# Patient Record
Sex: MALE | Race: WHITE
[De-identification: names, ages, dates, MRNs, and addresses within clinical notes are randomized per-mention and may not be internally consistent; named-entity substitution may affect disease eponyms.]

---

## 2018-12-28 LAB
ANION GAP SERPL CALC-SCNC: 9 MMOL/L (ref 5–19)
BUN SERPL-MCNC: 18 MG/DL (ref 7–20)
CALCIUM: 10 MG/DL (ref 8.4–10.2)
CHLORIDE SERPL-SCNC: 104 MMOL/L (ref 98–107)
CO2 SERPL-SCNC: 28 MMOL/L (ref 22–30)
ERYTHROCYTE [DISTWIDTH] IN BLOOD BY AUTOMATED COUNT: 14.1 % (ref 11.5–14)
GLUCOSE SERPL-MCNC: 106 MG/DL (ref 75–110)
HCT VFR BLD CALC: 46 % (ref 37.9–51)
HGB BLD-MCNC: 15.6 G/DL (ref 13.5–17)
MCH RBC QN AUTO: 30.6 PG (ref 27–33.4)
MCHC RBC AUTO-ENTMCNC: 34 G/DL (ref 32–36)
MCV RBC AUTO: 90 FL (ref 80–97)
PLATELET # BLD: 204 10^3/UL (ref 150–450)
POTASSIUM SERPL-SCNC: 4.8 MMOL/L (ref 3.6–5)
RBC # BLD AUTO: 5.11 10^6/UL (ref 4.35–5.55)
SODIUM SERPL-SCNC: 140.5 MMOL/L (ref 137–145)
WBC # BLD AUTO: 6.9 10^3/UL (ref 4–10.5)

## 2018-12-28 NOTE — EKG REPORT
SEVERITY:- ABNORMAL ECG -

SINUS RHYTHM

LEFT ANTERIOR FASCICULAR BLOCK

BORDERLINE T WAVE ABNORMALITIES

:

Confirmed by: Michelle Woo 28-Dec-2018 14:57:53

## 2018-12-28 NOTE — RADIOLOGY REPORT (SQ)
EXAM DESCRIPTION:  CHEST PA/LATERAL



COMPLETED DATE/TIME:  12/28/2018 8:43 am



REASON FOR STUDY:  PRE-OP



COMPARISON:  None.



EXAM PARAMETERS:  NUMBER OF VIEWS: two views

TECHNIQUE: Digital Frontal and Lateral radiographic views of the chest acquired.

RADIATION DOSE: NA

LIMITATIONS: none



FINDINGS:  LUNGS AND PLEURA: No opacities, masses or pneumothorax. No pleural effusion.

MEDIASTINUM AND HILAR STRUCTURES: No masses or contour abnormalities.

HEART AND VASCULAR STRUCTURES: Heart normal size.  No evidence for failure.

BONES: No acute findings.

HARDWARE: CABG.

OTHER: No other significant finding.



IMPRESSION:  No acute findings in the chest.



TECHNICAL DOCUMENTATION:  JOB ID:  [id number]

 2011 [employer]- All Rights Reserved



Reading location - IP/workstation name: [email or web address]

## 2019-01-04 ENCOUNTER — HOSPITAL ENCOUNTER (OUTPATIENT)
Dept: HOSPITAL 62 - OROUT | Age: 84
Discharge: HOME | End: 2019-01-04
Attending: SURGERY
Payer: MEDICARE

## 2019-01-04 VITALS — DIASTOLIC BLOOD PRESSURE: 75 MMHG | SYSTOLIC BLOOD PRESSURE: 132 MMHG

## 2019-01-04 DIAGNOSIS — I10: ICD-10-CM

## 2019-01-04 DIAGNOSIS — I48.91: ICD-10-CM

## 2019-01-04 DIAGNOSIS — K40.20: Primary | ICD-10-CM

## 2019-01-04 DIAGNOSIS — Z85.46: ICD-10-CM

## 2019-01-04 DIAGNOSIS — Z01.818: ICD-10-CM

## 2019-01-04 DIAGNOSIS — Z79.899: ICD-10-CM

## 2019-01-04 DIAGNOSIS — I25.10: ICD-10-CM

## 2019-01-04 DIAGNOSIS — Z79.82: ICD-10-CM

## 2019-01-04 PROCEDURE — 85027 COMPLETE CBC AUTOMATED: CPT

## 2019-01-04 PROCEDURE — 71046 X-RAY EXAM CHEST 2 VIEWS: CPT

## 2019-01-04 PROCEDURE — C1781 MESH (IMPLANTABLE): HCPCS

## 2019-01-04 PROCEDURE — 80048 BASIC METABOLIC PNL TOTAL CA: CPT

## 2019-01-04 PROCEDURE — 84132 ASSAY OF SERUM POTASSIUM: CPT

## 2019-01-04 PROCEDURE — 93010 ELECTROCARDIOGRAM REPORT: CPT

## 2019-01-04 PROCEDURE — 49650 LAP ING HERNIA REPAIR INIT: CPT

## 2019-01-04 PROCEDURE — 93005 ELECTROCARDIOGRAM TRACING: CPT

## 2019-01-04 PROCEDURE — 36415 COLL VENOUS BLD VENIPUNCTURE: CPT

## 2019-01-04 NOTE — DISCHARGE SUMMARY
Discharge Summary (SDC)





- Discharge


Final Diagnosis: 





Large bilateral inguinal hernias


Date of Surgery: 01/04/19


Discharge Date: 01/04/19


Condition: Stable


Treatment or Instructions: 


Discharge home.  Diet as tolerated.  Activity: No lifting greater than 10 pounds

x 4 weeks.  Follow-up with me in 7-10 days.  Norco 10/325 mg p.o. every 6 hours 

as needed for pain.  Okay to shower in 48 hours.  No tub baths times 2 weeks.


Referrals: 


ARON VASQUEZ MD [Primary Care Provider] - 


Discharge Diet: As Tolerated


Respiratory Treatments at Home: Deep Breathing/Coughing, Incentive Spirometer


Discharge Activity: No Lifting Over 10 Pounds


Home Care Assistance: None Needed


Report the Following to Your Physician Immediately: Shortness of Breath, Nausea,

Vomiting, Fever over 101 Degrees, Unusual Bleeding, Redness, Swelling, Warmth

## 2019-01-04 NOTE — OPERATIVE REPORT
Nonrecallable Operative Report


DATE OF SURGERY: 01/04/19


PREOPERATIVE DIAGNOSIS: Large bilateral inguinal hernias


POSTOPERATIVE DIAGNOSIS: 1.  Large bilateral inguinal hernias.  2.  Inflammatory

reaction in the right and left lower quadrants involving the colon and small 

bowel.


OPERATION: 1.  Robot-assisted laparoscopic lysis of adhesions for greater than 1

hour.  2.  Robot-assisted laparoscopic bilateral inguinal hernia repair with 

mesh.


SURGEON: CARLITA ADLER


ANESTHESIA: GA


TISSUE REMOVED OR ALTERED: None


COMPLICATIONS: 





None apparent


ESTIMATED BLOOD LOSS: Minimal


PROCEDURE: 





Drains/implants: Right and left large 3 DMax inguinal hernia mesh.





Procedure in detail: After informed consent was obtained, the patient was 

brought to the operating room and laid in supine position.  The area of the 

abdomen was prepped and draped in a normal sterile fashion.  A supraumbilical 

incision was created with a 15 blade scalpel.  Dissection was carried through 

the subcutaneous tissue using sharp and blunt dissection.  The linea alba fascia

was incised sharply, the abdomen was entered sharply.  The balloon trocar was 

inserted, and pneumoperitoneum was achieved.  





2 right and left lateral abdominal wall trochars were placed under direct 

laparoscopic visualization.  These were 8 mm robotic trochars.  The robot was 

then brought over the patient and docked appropriately.  I then assumed my 

position at the surgeon's console.  Upon examination of the abdominal cavity 

there is noted to be an inflammatory reaction with relatively dense adhesions 

present in bilateral groins involving the colon and small bowel.  Lysis of 

adhesions was then undertaken sharply in order to free the sigmoid colon and 

small bowel from the left anterior/inferior abdominal wall.  Sharp dissection 

was also used to free the cecum and small bowel from the right lower anterior 

abdominal wall.  Once the lysis of adhesions was completed and bilateral 

inguinal hernia defects could be identified, they were found to be rather large.

 Dissection was begun in the right groin.  The peritoneum was scored 2-3 cm 

superior to the right inguinal hernia defect.  A preperitoneal dissection was 

then undertaken.  The hernia sac was reduced into the abdominal cavity using 

sharp dissection, blunt dissection, and electrocautery.  Great care was taken 

not to injure the cord structures.  Once the hernia sac was freed, a 3 DMax 

inguinal hernia mesh was placed into the preperitoneal space.  The mesh was 

sutured medially and superiorly using 2-0 Vicryl suture.  Once the mesh was 

found to lie in good place, the peritoneum was closed.  This was done using 2-0 

V lock suture in simple running fashion.  Attention was then turned to the left 

groin.





The left inguinal hernia defect was identified.  The peritoneum was scored at 

approximately 2-3 cm superior to the inguinal hernia defect.  A preperitoneal 

dissection was undertaken.  This was done with sharp dissection, blunt 

dissection, and electrocautery.  The hernia sac was freed with great care so as 

not to injure the cord structures.  Once this was completed a large left-sided 3

DMax inguinal hernia mesh was placed into the preperitoneal space.  It was 

sutured using 2-0 Vicryl suture medially and superiorly.  Once the mesh was 

sutured in, the peritoneum was closed using 2-0 V lock suture in simple running 

fashion.  Once this was completed the hernia repairs were inspected.  Everything

appeared in good order.  The bowel was also inspected and was found to be free 

of any obvious defect.  Once this was completed, the robot was undocked and I 

scrubbed back into the case.  The trochars were at this time removed.  The 

supraumbilical fascia was closed using 0 Vicryl suture in figure-of-eight 

fashion.  The overlying skin was closed using 4-0 Vicryl Rapide suture in 

subcuticular fashion.  Dressings were placed, and the procedure was concluded.  

All sponge, instrument, and needle counts were correct x2.





Condition: Stable.





Abby Mclaughlin PA-C was scrubbed and present the entirety of the procedure.  

She assisted with all portions of the procedure including placement of the 

trochars, docking of the robot, exchanging of the robotic instruments, closure 

of the fascia, and closure of the skin.